# Patient Record
Sex: FEMALE | Race: ASIAN | NOT HISPANIC OR LATINO | URBAN - METROPOLITAN AREA
[De-identification: names, ages, dates, MRNs, and addresses within clinical notes are randomized per-mention and may not be internally consistent; named-entity substitution may affect disease eponyms.]

---

## 2021-01-18 ENCOUNTER — OUTPATIENT (OUTPATIENT)
Dept: OUTPATIENT SERVICES | Facility: HOSPITAL | Age: 21
LOS: 1 days | End: 2021-01-18
Payer: MEDICAID

## 2021-01-18 DIAGNOSIS — Z3A.00 WEEKS OF GESTATION OF PREGNANCY NOT SPECIFIED: ICD-10-CM

## 2021-01-18 DIAGNOSIS — O26.899 OTHER SPECIFIED PREGNANCY RELATED CONDITIONS, UNSPECIFIED TRIMESTER: ICD-10-CM

## 2021-01-18 LAB
APPEARANCE UR: CLEAR — SIGNIFICANT CHANGE UP
BACTERIA # UR AUTO: ABNORMAL /HPF
BILIRUB UR-MCNC: NEGATIVE — SIGNIFICANT CHANGE UP
COLOR SPEC: YELLOW — SIGNIFICANT CHANGE UP
DIFF PNL FLD: NEGATIVE — SIGNIFICANT CHANGE UP
EPI CELLS # UR: SIGNIFICANT CHANGE UP /HPF
GLUCOSE UR QL: 50 MG/DL
KETONES UR-MCNC: NEGATIVE — SIGNIFICANT CHANGE UP
LEUKOCYTE ESTERASE UR-ACNC: ABNORMAL
NITRITE UR-MCNC: NEGATIVE — SIGNIFICANT CHANGE UP
PH UR: 6.5 — SIGNIFICANT CHANGE UP (ref 5–8)
PROT UR-MCNC: 15
RBC CASTS # UR COMP ASSIST: SIGNIFICANT CHANGE UP /HPF (ref 0–2)
SP GR SPEC: 1.01 — SIGNIFICANT CHANGE UP (ref 1.01–1.02)
UROBILINOGEN FLD QL: NEGATIVE — SIGNIFICANT CHANGE UP
WBC UR QL: SIGNIFICANT CHANGE UP /HPF (ref 0–5)

## 2021-01-18 PROCEDURE — G0463: CPT

## 2021-01-18 PROCEDURE — 81001 URINALYSIS AUTO W/SCOPE: CPT

## 2021-01-18 PROCEDURE — 59025 FETAL NON-STRESS TEST: CPT

## 2021-01-18 PROCEDURE — 99281 EMR DPT VST MAYX REQ PHY/QHP: CPT

## 2021-02-02 ENCOUNTER — INPATIENT (INPATIENT)
Facility: HOSPITAL | Age: 21
LOS: 1 days | Discharge: HOME | End: 2021-02-04
Attending: SPECIALIST | Admitting: SPECIALIST
Payer: MEDICAID

## 2021-02-02 VITALS — TEMPERATURE: 99 F

## 2021-02-02 DIAGNOSIS — Z3A.00 WEEKS OF GESTATION OF PREGNANCY NOT SPECIFIED: ICD-10-CM

## 2021-02-02 DIAGNOSIS — O26.899 OTHER SPECIFIED PREGNANCY RELATED CONDITIONS, UNSPECIFIED TRIMESTER: ICD-10-CM

## 2021-02-02 LAB
ALBUMIN SERPL ELPH-MCNC: 3.9 G/DL — SIGNIFICANT CHANGE UP (ref 3.3–5)
ALP SERPL-CCNC: 463 U/L — HIGH (ref 40–120)
ALT FLD-CCNC: 14 U/L — SIGNIFICANT CHANGE UP (ref 4–33)
AMPHET UR-MCNC: NEGATIVE — SIGNIFICANT CHANGE UP
ANION GAP SERPL CALC-SCNC: 15 MMOL/L — HIGH (ref 7–14)
APTT BLD: 39.6 SEC — HIGH (ref 27–36.3)
AST SERPL-CCNC: 18 U/L — SIGNIFICANT CHANGE UP (ref 4–32)
BARBITURATES UR SCN-MCNC: NEGATIVE — SIGNIFICANT CHANGE UP
BASOPHILS # BLD AUTO: 0.04 K/UL — SIGNIFICANT CHANGE UP (ref 0–0.2)
BASOPHILS NFR BLD AUTO: 0.3 % — SIGNIFICANT CHANGE UP (ref 0–2)
BENZODIAZ UR-MCNC: NEGATIVE — SIGNIFICANT CHANGE UP
BILIRUB SERPL-MCNC: 0.3 MG/DL — SIGNIFICANT CHANGE UP (ref 0.2–1.2)
BLD GP AB SCN SERPL QL: NEGATIVE — SIGNIFICANT CHANGE UP
BUN SERPL-MCNC: 12 MG/DL — SIGNIFICANT CHANGE UP (ref 7–23)
CALCIUM SERPL-MCNC: 9.6 MG/DL — SIGNIFICANT CHANGE UP (ref 8.4–10.5)
CHLORIDE SERPL-SCNC: 99 MMOL/L — SIGNIFICANT CHANGE UP (ref 98–107)
CO2 SERPL-SCNC: 22 MMOL/L — SIGNIFICANT CHANGE UP (ref 22–31)
COCAINE METAB.OTHER UR-MCNC: NEGATIVE — SIGNIFICANT CHANGE UP
CREAT ?TM UR-MCNC: 107 MG/DL — SIGNIFICANT CHANGE UP
CREAT SERPL-MCNC: 0.66 MG/DL — SIGNIFICANT CHANGE UP (ref 0.5–1.3)
CREATININE URINE RESULT, DAU: 106 MG/DL — SIGNIFICANT CHANGE UP
EOSINOPHIL # BLD AUTO: 0.16 K/UL — SIGNIFICANT CHANGE UP (ref 0–0.5)
EOSINOPHIL NFR BLD AUTO: 1.3 % — SIGNIFICANT CHANGE UP (ref 0–6)
FIBRINOGEN PPP-MCNC: 763 MG/DL — HIGH (ref 290–520)
GLUCOSE SERPL-MCNC: 75 MG/DL — SIGNIFICANT CHANGE UP (ref 70–99)
HCT VFR BLD CALC: 39.5 % — SIGNIFICANT CHANGE UP (ref 34.5–45)
HGB BLD-MCNC: 12.4 G/DL — SIGNIFICANT CHANGE UP (ref 11.5–15.5)
HIV 1+2 AB+HIV1 P24 AG SERPL QL IA: SIGNIFICANT CHANGE UP
IANC: 8.38 K/UL — SIGNIFICANT CHANGE UP (ref 1.5–8.5)
IMM GRANULOCYTES NFR BLD AUTO: 0.7 % — SIGNIFICANT CHANGE UP (ref 0–1.5)
INR BLD: 0.87 RATIO — SIGNIFICANT CHANGE UP (ref 0.88–1.16)
LDH SERPL L TO P-CCNC: 208 U/L — SIGNIFICANT CHANGE UP (ref 135–225)
LYMPHOCYTES # BLD AUTO: 2.83 K/UL — SIGNIFICANT CHANGE UP (ref 1–3.3)
LYMPHOCYTES # BLD AUTO: 23 % — SIGNIFICANT CHANGE UP (ref 13–44)
MCHC RBC-ENTMCNC: 28.7 PG — SIGNIFICANT CHANGE UP (ref 27–34)
MCHC RBC-ENTMCNC: 31.4 GM/DL — LOW (ref 32–36)
MCV RBC AUTO: 91.4 FL — SIGNIFICANT CHANGE UP (ref 80–100)
METHADONE UR-MCNC: NEGATIVE — SIGNIFICANT CHANGE UP
MONOCYTES # BLD AUTO: 0.83 K/UL — SIGNIFICANT CHANGE UP (ref 0–0.9)
MONOCYTES NFR BLD AUTO: 6.7 % — SIGNIFICANT CHANGE UP (ref 2–14)
NEUTROPHILS # BLD AUTO: 8.38 K/UL — HIGH (ref 1.8–7.4)
NEUTROPHILS NFR BLD AUTO: 68 % — SIGNIFICANT CHANGE UP (ref 43–77)
NRBC # BLD: 0 /100 WBCS — SIGNIFICANT CHANGE UP
NRBC # FLD: 0 K/UL — SIGNIFICANT CHANGE UP
OPIATES UR-MCNC: NEGATIVE — SIGNIFICANT CHANGE UP
OXYCODONE UR-MCNC: NEGATIVE — SIGNIFICANT CHANGE UP
PCP SPEC-MCNC: SIGNIFICANT CHANGE UP
PCP UR-MCNC: NEGATIVE — SIGNIFICANT CHANGE UP
PLATELET # BLD AUTO: 276 K/UL — SIGNIFICANT CHANGE UP (ref 150–400)
POTASSIUM SERPL-MCNC: 4.1 MMOL/L — SIGNIFICANT CHANGE UP (ref 3.5–5.3)
POTASSIUM SERPL-SCNC: 4.1 MMOL/L — SIGNIFICANT CHANGE UP (ref 3.5–5.3)
PROT ?TM UR-MCNC: 22 MG/DL — SIGNIFICANT CHANGE UP
PROT ?TM UR-MCNC: 22 MG/DL — SIGNIFICANT CHANGE UP
PROT SERPL-MCNC: 8 G/DL — SIGNIFICANT CHANGE UP (ref 6–8.3)
PROT/CREAT UR-RTO: 0.2 RATIO — SIGNIFICANT CHANGE UP (ref 0–0.2)
PROTHROM AB SERPL-ACNC: 10.1 SEC — LOW (ref 10.6–13.6)
RBC # BLD: 4.32 M/UL — SIGNIFICANT CHANGE UP (ref 3.8–5.2)
RBC # FLD: 13.2 % — SIGNIFICANT CHANGE UP (ref 10.3–14.5)
RH IG SCN BLD-IMP: POSITIVE — SIGNIFICANT CHANGE UP
RH IG SCN BLD-IMP: POSITIVE — SIGNIFICANT CHANGE UP
SODIUM SERPL-SCNC: 136 MMOL/L — SIGNIFICANT CHANGE UP (ref 135–145)
THC UR QL: NEGATIVE — SIGNIFICANT CHANGE UP
URATE SERPL-MCNC: 4.3 MG/DL — SIGNIFICANT CHANGE UP (ref 2.5–7)
WBC # BLD: 12.33 K/UL — HIGH (ref 3.8–10.5)
WBC # FLD AUTO: 12.33 K/UL — HIGH (ref 3.8–10.5)

## 2021-02-02 RX ORDER — OXYTOCIN 10 UNIT/ML
333.33 VIAL (ML) INJECTION
Qty: 20 | Refills: 0 | Status: DISCONTINUED | OUTPATIENT
Start: 2021-02-02 | End: 2021-02-03

## 2021-02-02 RX ORDER — SODIUM CHLORIDE 9 MG/ML
1000 INJECTION, SOLUTION INTRAVENOUS
Refills: 0 | Status: DISCONTINUED | OUTPATIENT
Start: 2021-02-02 | End: 2021-02-03

## 2021-02-02 NOTE — OB PROVIDER TRIAGE NOTE - NS_FHRDECEL_OBGYN_ALL_OB
Spoke with pharmacy and they still have refills on file but will change to 3 month supply.  Radha Marshall RN, BSN     No Decelerations

## 2021-02-02 NOTE — OB PROVIDER H&P - NSHPPHYSICALEXAM_GEN_ALL_CORE
19 y/o  @ 40 wks gestation presents with c/o episode of vaginal spotting this AM and was seen in clinic @ [facility] clinic today in [place] and was told to go to L&D for monitoring pt reports having irregular uterine contractions denies any LOF reports +FM denies any n/v/d denies any fever or chills pt was receiving PNC 1st 6 months in [facility] and has been back and forth since 2020 between  and [facility]

## 2021-02-02 NOTE — OB PROVIDER H&P - ASSESSMENT
plan of care d/w dr cast   no evidence of VB  no evidence of active labor   will continue to monitor      addendum @ 1815:  plan of care d/w dr Javed/ Dr cast   admit to l&D  IOL @ 40 wks gest for po cytotec / cat 2 FHT   see admission orders

## 2021-02-02 NOTE — OB PROVIDER TRIAGE NOTE - NSHPPHYSICALEXAM_GEN_ALL_CORE
abdomen: soft, nt on palp  SSE: no evidence of VB   SVE: 1/60/-3  T(C): 37 (02-02-21 @ 16:49), Max: 37.0 (02-02-21 @ 16:41)  HR: 98 (02-02-21 @ 17:45) (98 - 111)  BP: 118/63 (02-02-21 @ 17:45) (118/63 - 135/76)  RR: 16 (02-02-21 @ 16:49) (16 - 16)  SpO2: --  TAS abdomen: soft, nt on palp  SSE: no evidence of VB   SVE: 1/60/-3  T(C): 37 (02-02-21 @ 16:49), Max: 37.0 (02-02-21 @ 16:41)  HR: 98 (02-02-21 @ 17:45) (98 - 111)  BP: 118/63 (02-02-21 @ 17:45) (118/63 - 135/76)  RR: 16 (02-02-21 @ 16:49) (16 - 16)  SpO2: --  TAS : BPP: 8/8 vtx posterior placenta FLOR: 6.29   cat 1 FHT  toco: every 5 minutes , pt comfortable denies any pain at this time    ht: 5'0  wt: 170

## 2021-02-02 NOTE — OB RN TRIAGE NOTE - CHIEF COMPLAINT QUOTE
my doctor at [facility] told me to come get monitored bc my due date is today and spotting this morning, and I was itchy this am

## 2021-02-02 NOTE — OB PROVIDER TRIAGE NOTE - HISTORY OF PRESENT ILLNESS
21 y/o  @ 40 wks gestation presents with c/o episode of vaginal spotting this AM and was seen in clinic @ [facility] clinic today in [place] and was told to go to L&D for monitoring pt reports having irregular uterine contractions denies any LOF reports +FM denies any n/v/d denies any fever or chills pt was receiving PNC 1st 6 months in [facility] and has been back and forth since 2020 between  and [facility]

## 2021-02-02 NOTE — OB PROVIDER H&P - HISTORY OF PRESENT ILLNESS
21 y/o  @ 40 wks gestation presents with c/o episode of vaginal spotting this AM and was seen in clinic @ [facility] clinic today in [place] and was told to go to L&D for monitoring pt reports having irregular uterine contractions denies any LOF reports +FM denies any n/v/d denies any fever or chills pt was receiving PNC 1st 6 months in [facility] and has been back and forth since 2020 between  and [facility]

## 2021-02-02 NOTE — OB PROVIDER TRIAGE NOTE - NSOBPROVIDERNOTE_OBGYN_ALL_OB_FT
19 y/o  @ 40 wks gestation presents with c/o episode of vaginal spotting this AM and was seen in clinic @ [facility] clinic today in [place] and was told to go to L&D for monitoring pt reports having irregular uterine contractions denies any LOF reports +FM denies any n/v/d denies any fever or chills pt was receiving PNC 1st 6 months in [facility] and has been back and forth since 2020 between  and [facility] 19 y/o  @ 40 wks gestation presents with c/o episode of vaginal spotting this AM and was seen in clinic @ [facility] clinic today in [place] and was told to go to L&D for monitoring pt reports having irregular uterine contractions denies any LOF reports +FM denies any n/v/d denies any fever or chills pt was receiving PNC 1st 6 months in [facility] and has been back and forth since 2020 between  and [facility]      plan of care d/w dr cast   no evidnence of VB  no evidence of active labor   will continue to monitor 21 y/o  @ 40 wks gestation presents with c/o episode of vaginal spotting this AM and was seen in clinic @ [facility] clinic today in [place] and was told to go to L&D for monitoring pt reports having irregular uterine contractions denies any LOF reports +FM denies any n/v/d denies any fever or chills pt was receiving PNC 1st 6 months in [facility] and has been back and forth since 2020 between  and [facility]      plan of care d/w dr cast   no evidence of VB  no evidence of active labor   will continue to monitor      addendum @ 181:  plan of care d/w dr Javed/ Dr cast   admit to l&D  IOL @ 40 wks gest for po cytotec / cat 2 FHT   see admission orders

## 2021-02-02 NOTE — CHART NOTE - NSCHARTNOTEFT_GEN_A_CORE
*NOTE DELAYED 2/2 CLINICAL DUTIES*    PGY1 Labor & Delivery Progress Note     Pt seen & examined at bedside. Balloon was placed, bleeding was noted, decision was made to remove cervical ballloon.    SVE: /-3  EFM: 130/mod. variability/+accles/-decels  [place]: irregular    T(C): 37.2 (21 @ 21:32), Max: 37.2 (21 @ 19:00)  HR: 112 (21 @ 21:36) (96 - 114)  BP: 120/59 (21 @ 21:27) (117/58 - 135/76)  RR: 18 (21 @ 19:06) (16 - 18)  SpO2: 99% (21 @ 21:36) (98% - 100%)      Plan: 19 y/o  @40w in stable condition  - Con't IOL w/ PO cytotec  - Continuous EFM, [place]  - Con't IVF    D/W attending physician Dr. Tegan Winston MD  PGY-1

## 2021-02-03 LAB
HBV SURFACE AG SER-ACNC: SIGNIFICANT CHANGE UP
RUBV IGG SER-ACNC: 5.6 INDEX — SIGNIFICANT CHANGE UP
RUBV IGG SER-IMP: POSITIVE — SIGNIFICANT CHANGE UP
SARS-COV-2 IGG SERPL QL IA: NEGATIVE — SIGNIFICANT CHANGE UP
SARS-COV-2 IGM SERPL IA-ACNC: 0.08 INDEX — SIGNIFICANT CHANGE UP
SARS-COV-2 RNA SPEC QL NAA+PROBE: SIGNIFICANT CHANGE UP
T PALLIDUM AB TITR SER: NEGATIVE — SIGNIFICANT CHANGE UP

## 2021-02-03 PROCEDURE — 59409 OBSTETRICAL CARE: CPT | Mod: U9,GC

## 2021-02-03 RX ORDER — LANOLIN
1 OINTMENT (GRAM) TOPICAL EVERY 6 HOURS
Refills: 0 | Status: DISCONTINUED | OUTPATIENT
Start: 2021-02-03 | End: 2021-02-04

## 2021-02-03 RX ORDER — DIPHENHYDRAMINE HCL 50 MG
25 CAPSULE ORAL EVERY 6 HOURS
Refills: 0 | Status: DISCONTINUED | OUTPATIENT
Start: 2021-02-03 | End: 2021-02-04

## 2021-02-03 RX ORDER — BENZOCAINE 10 %
1 GEL (GRAM) MUCOUS MEMBRANE EVERY 6 HOURS
Refills: 0 | Status: DISCONTINUED | OUTPATIENT
Start: 2021-02-03 | End: 2021-02-04

## 2021-02-03 RX ORDER — TETANUS TOXOID, REDUCED DIPHTHERIA TOXOID AND ACELLULAR PERTUSSIS VACCINE, ADSORBED 5; 2.5; 8; 8; 2.5 [IU]/.5ML; [IU]/.5ML; UG/.5ML; UG/.5ML; UG/.5ML
0.5 SUSPENSION INTRAMUSCULAR ONCE
Refills: 0 | Status: DISCONTINUED | OUTPATIENT
Start: 2021-02-03 | End: 2021-02-04

## 2021-02-03 RX ORDER — SODIUM CHLORIDE 9 MG/ML
3 INJECTION INTRAMUSCULAR; INTRAVENOUS; SUBCUTANEOUS EVERY 8 HOURS
Refills: 0 | Status: DISCONTINUED | OUTPATIENT
Start: 2021-02-03 | End: 2021-02-04

## 2021-02-03 RX ORDER — ACETAMINOPHEN 500 MG
975 TABLET ORAL
Refills: 0 | Status: DISCONTINUED | OUTPATIENT
Start: 2021-02-03 | End: 2021-02-04

## 2021-02-03 RX ORDER — MAGNESIUM HYDROXIDE 400 MG/1
30 TABLET, CHEWABLE ORAL
Refills: 0 | Status: DISCONTINUED | OUTPATIENT
Start: 2021-02-03 | End: 2021-02-04

## 2021-02-03 RX ORDER — OXYCODONE HYDROCHLORIDE 5 MG/1
5 TABLET ORAL ONCE
Refills: 0 | Status: DISCONTINUED | OUTPATIENT
Start: 2021-02-03 | End: 2021-02-04

## 2021-02-03 RX ORDER — PRAMOXINE HYDROCHLORIDE 150 MG/15G
1 AEROSOL, FOAM RECTAL EVERY 4 HOURS
Refills: 0 | Status: DISCONTINUED | OUTPATIENT
Start: 2021-02-03 | End: 2021-02-04

## 2021-02-03 RX ORDER — HYDROCORTISONE 1 %
1 OINTMENT (GRAM) TOPICAL EVERY 6 HOURS
Refills: 0 | Status: DISCONTINUED | OUTPATIENT
Start: 2021-02-03 | End: 2021-02-04

## 2021-02-03 RX ORDER — SIMETHICONE 80 MG/1
80 TABLET, CHEWABLE ORAL EVERY 4 HOURS
Refills: 0 | Status: DISCONTINUED | OUTPATIENT
Start: 2021-02-03 | End: 2021-02-04

## 2021-02-03 RX ORDER — OXYCODONE HYDROCHLORIDE 5 MG/1
5 TABLET ORAL
Refills: 0 | Status: DISCONTINUED | OUTPATIENT
Start: 2021-02-03 | End: 2021-02-04

## 2021-02-03 RX ORDER — OXYTOCIN 10 UNIT/ML
333.33 VIAL (ML) INJECTION
Qty: 20 | Refills: 0 | Status: DISCONTINUED | OUTPATIENT
Start: 2021-02-03 | End: 2021-02-04

## 2021-02-03 RX ORDER — DIBUCAINE 1 %
1 OINTMENT (GRAM) RECTAL EVERY 6 HOURS
Refills: 0 | Status: DISCONTINUED | OUTPATIENT
Start: 2021-02-03 | End: 2021-02-04

## 2021-02-03 RX ORDER — AER TRAVELER 0.5 G/1
1 SOLUTION RECTAL; TOPICAL EVERY 4 HOURS
Refills: 0 | Status: DISCONTINUED | OUTPATIENT
Start: 2021-02-03 | End: 2021-02-04

## 2021-02-03 RX ORDER — IBUPROFEN 200 MG
600 TABLET ORAL EVERY 6 HOURS
Refills: 0 | Status: COMPLETED | OUTPATIENT
Start: 2021-02-03 | End: 2022-01-02

## 2021-02-03 RX ORDER — KETOROLAC TROMETHAMINE 30 MG/ML
30 SYRINGE (ML) INJECTION ONCE
Refills: 0 | Status: DISCONTINUED | OUTPATIENT
Start: 2021-02-03 | End: 2021-02-03

## 2021-02-03 RX ORDER — SODIUM CHLORIDE 9 MG/ML
1000 INJECTION, SOLUTION INTRAVENOUS
Refills: 0 | Status: DISCONTINUED | OUTPATIENT
Start: 2021-02-03 | End: 2021-02-03

## 2021-02-03 RX ADMIN — SODIUM CHLORIDE 3 MILLILITER(S): 9 INJECTION INTRAMUSCULAR; INTRAVENOUS; SUBCUTANEOUS at 23:51

## 2021-02-03 RX ADMIN — Medication 975 MILLIGRAM(S): at 20:15

## 2021-02-03 RX ADMIN — Medication 30 MILLIGRAM(S): at 18:45

## 2021-02-03 RX ADMIN — SODIUM CHLORIDE 125 MILLILITER(S): 9 INJECTION, SOLUTION INTRAVENOUS at 08:03

## 2021-02-03 RX ADMIN — Medication 1000 MILLIUNIT(S)/MIN: at 18:45

## 2021-02-03 NOTE — OB NEONATOLOGY/PEDIATRICIAN DELIVERY SUMMARY - NSPEDSNEONOTESA_OBGYN_ALL_OB_FT
Baby is a 40.1 wk GA male born to a 19 y/o  mother via . PEDS called to delivery for cat. II tracing. Maternal history uncomplicated. Prenatal history aspirin during pregnancy. Maternal blood type O+. PNL negative, non-reactive, and immune. GBS negative on . SROM at 12:34 on , indeterminate fluids. Baby born vigorous and crying spontaneously. Warmed, dried, stimulated. Apgars 8/9. EOS 0.06. Mom plans to breastfeed and bottlefeed and consents hepB. Circ requested.

## 2021-02-03 NOTE — CHART NOTE - NSCHARTNOTEFT_GEN_A_CORE
Prenatal records received from [facility] [place]  chart reviewed  gbs negative 1/6/21  quant gold positive 11/17/20  chest x ray negative 12/2/20 (visible in [place] but unable to print results)    DONAVON Bruner NP

## 2021-02-03 NOTE — CHART NOTE - NSCHARTNOTEFT_GEN_A_CORE
R2 Chart note    Called [facility] to follow up regarding release of prenatal records.  Unit receptionist still had release at the desk which was faxed over at 2am.  She said she would now give it to the resident and they would fax the records.  However due to change of shift and morning rounds there will be a delay of at least one hour.  Direct number to their L&D provided ([phone]).    Will continue to follow up if records are not received.    d/w Dr. Elsie Huston, PGY2

## 2021-02-03 NOTE — OB RN DELIVERY SUMMARY - NS_SEPSISRSKCALC_OBGYN_ALL_OB_FT
EOS calculated successfully. EOS Risk Factor: 0.5/1000 live births ([facility] national incidence); GA=40w1d; Temp=99.32; ROM=3.983; GBS='Unknown'; Antibiotics='No antibiotics or any antibiotics < 2 hrs prior to birth'

## 2021-02-03 NOTE — OB PROVIDER DELIVERY SUMMARY - NSPROVIDERDELIVERYNOTE_OBGYN_ALL_OB_FT
Pt was noted to be fully dilated with the urge to push. Spontaneous vaginal delivery of liveborn infant from JACQUELYN position. Head, shoulders, and body delivered easily. Infant handed to mom, delayed cord clamping. Cord clamped and cut, infant handed to peds. Placenta delivered spontaneously and intact. 2nd degree laceration and right periurethral noted and repaired with 3.0 and 2.0 chromic. Excellent hemostasis noted. Count correct x2.

## 2021-02-03 NOTE — OB PROVIDER LABOR PROGRESS NOTE - ASSESSMENT
19yo  IOL  - CB placed at 7:45am   - continue PO cytotec  - routine labor care  - continuous monitoring  - pt comfortable with epidural    per plan  Candace Johnson PGY1
19yo  IOL  - s/p PO and CB  - Expectant management  - start pitocin if no progress  - continuous monitoring  - routine labor care    d/w Dr. Mariluz Johnson PGy1

## 2021-02-03 NOTE — CHART NOTE - NSCHARTNOTEFT_GEN_A_CORE
R2 Ob Chart Note    Pt received prenatal care at [facility].   Contacted L+D at [facility] for release of records.   HIPAA release signed by patient and faxed to [phone].   Provided w/ [facility] L+D Fax number [phone].     Richard PGY2

## 2021-02-03 NOTE — OB PROVIDER LABOR PROGRESS NOTE - NS_SUBJECTIVE/OBJECTIVE_OBGYN_ALL_OB_FT
Pt examined at bedside for cervical change
Pt examined at bedside for placement of cervical balloon. CB placed without incidence and pt tolerated procedure well

## 2021-02-04 VITALS
TEMPERATURE: 98 F | HEART RATE: 76 BPM | DIASTOLIC BLOOD PRESSURE: 59 MMHG | SYSTOLIC BLOOD PRESSURE: 112 MMHG | RESPIRATION RATE: 18 BRPM | OXYGEN SATURATION: 100 %

## 2021-02-04 RX ORDER — IBUPROFEN 200 MG
600 TABLET ORAL EVERY 6 HOURS
Refills: 0 | Status: DISCONTINUED | OUTPATIENT
Start: 2021-02-04 | End: 2021-02-04

## 2021-02-04 RX ORDER — ASPIRIN/CALCIUM CARB/MAGNESIUM 324 MG
0 TABLET ORAL
Qty: 0 | Refills: 0 | DISCHARGE

## 2021-02-04 RX ORDER — IBUPROFEN 200 MG
1 TABLET ORAL
Qty: 0 | Refills: 0 | DISCHARGE
Start: 2021-02-04

## 2021-02-04 RX ORDER — ACETAMINOPHEN 500 MG
3 TABLET ORAL
Qty: 0 | Refills: 0 | DISCHARGE
Start: 2021-02-04

## 2021-02-04 RX ADMIN — Medication 975 MILLIGRAM(S): at 03:43

## 2021-02-04 RX ADMIN — Medication 600 MILLIGRAM(S): at 09:00

## 2021-02-04 RX ADMIN — Medication 600 MILLIGRAM(S): at 18:09

## 2021-02-04 RX ADMIN — Medication 1 TABLET(S): at 09:25

## 2021-02-04 NOTE — DISCHARGE NOTE OB - CARE PROVIDER_API CALL
[facility],   Phone: [phone]  Fax: (   )    -  Follow Up Time:     [facility],   Ambulatory Care Unit, Oncology Building  [address] [place] [address]  Phone: [phone]  Fax: (   )    -  Follow Up Time:

## 2021-02-04 NOTE — LACTATION INITIAL EVALUATION - INTERVENTION OUTCOME
nbn demonstrated  deep latch and  performed  with sucking and swallowing  noted  short bursts. encouraged  to ask for assistance as needed. . follow  up with peds in 24 hours if  discharged today./verbalizes understanding/needs not met

## 2021-02-04 NOTE — DISCHARGE NOTE OB - HOSPITAL COURSE
Patient was admitted to L+D for , Pt had an uncomplicated  followed by an uncomplicated postpartum course.  EBL: 300  Hct: 39.5  On Postpartum day 1, patient was discharged home in stable condition, voiding spontaneously, pain well controlled, ambulating, tolerating PO and with normal vital signs. Patient's postpartum birth control plan is a postpartum nexplanon. Pt plans to follow up in the [facility] in 6 weeks. Telephone number and clinic information provided prior to discharge.

## 2021-02-04 NOTE — DISCHARGE NOTE OB - COMMUNITY RESOURCE NAME:
Patient instructed to make a postpartum follow up appointment at the Ambulatory Care Unit at [facility]  4-6 weeks after her delivery date. Patient also instructed to make follow up appointment for the baby at [facility], Division of General Pediatrics  for 1-2 days after discharge.

## 2021-02-04 NOTE — DISCHARGE NOTE OB - ADDITIONAL INSTRUCTIONS
Make your follow-up appointment with your doctor as ordered.  Make an appt in 6 weeks for a routine postpartum visit.     No heavy lifting, driving, or strenuous activity for 6 weeks. Nothing per vagina such as tampons, intercourse, douches, or tub baths for 6 weeks or until you see your doctor. Call your doctor with any signs and symptoms of infection such as fever, chills, nausea, or vomiting. Call your doctor if you're unable to tolerate food, increase in vaginal bleeding, or have difficulty urinating. Call your doctor if you have pain that is not relieved by your prescribed medications. Notify your doctor with any other concerns.     Call [phone] if you have any of these concerns in the next 6 weeks.

## 2021-02-04 NOTE — LACTATION INITIAL EVALUATION - LACTATION INTERVENTIONS
reviewed with  mother breastfeeding section  of  postpartum  book./initiate skin to skin/initiate hand expression routine/initiate/review finger suck/initiate/review breast massage/compression

## 2021-02-04 NOTE — DISCHARGE NOTE OB - CARE PLAN
Principal Discharge DX:	 (normal spontaneous vaginal delivery)  Goal:	full recovery  Assessment and plan of treatment:	Make your follow-up appointment with your doctor as ordered.  Make an appt in 6 weeks for a routine postpartum visit.     No heavy lifting, driving, or strenuous activity for 6 weeks. Nothing per vagina such as tampons, intercourse, douches, or tub baths for 6 weeks or until you see your doctor. Call your doctor with any signs and symptoms of infection such as fever, chills, nausea, or vomiting. Call your doctor if you're unable to tolerate food, increase in vaginal bleeding, or have difficulty urinating. Call your doctor if you have pain that is not relieved by your prescribed medications. Notify your doctor with any other concerns.     Call [phone] if you have any of these concerns in the next 6 weeks.

## 2021-02-04 NOTE — DISCHARGE NOTE OB - PATIENT PORTAL LINK FT
You can access the FollowMyHealth Patient Portal offered by [facility] by registering at the following website: [email or web address]. By joining [facility]’s FollowMyHealth portal, you will also be able to view your health information using other applications (apps) compatible with our system.

## 2021-02-04 NOTE — PROGRESS NOTE ADULT - PROBLEM SELECTOR PLAN 1
- Pain well controlled, continue current pain regimen  - Increase ambulation, SCDs when not ambulating  - Continue regular diet  - Desires circumcision  - Contraception:     Ofelia White MD PGY1 - Pain well controlled, continue current pain regimen  - Increase ambulation, SCDs when not ambulating  - Continue regular diet  - Desires circumcision  - Contraception: desires Nexplanon at 6wk postpartum visit    Ofelia White MD PGY1 - Pain well controlled, continue current pain regimen  - Increase ambulation, SCDs when not ambulating  - Continue regular diet  - Declines circumcision  - Contraception: desires Nexplanon at 6wk postpartum visit    Ofelia White MD PGY1

## 2021-02-04 NOTE — PROGRESS NOTE ADULT - ATTENDING COMMENTS
Associate Chief of L & D (Late entry)     I have not met this patient before today.  She was admitted by Dr Javed. She received her care at a [facility] affiliated with [facility].  She is also new to the  from [facility].  She was delivered by Dr. Abreu      OB Progress Note:  PPD#1    S: 19yo  PPD#1 s/p . Patient denies any complaints at this time    O:  Vitals:  Vital Signs Last 24 Hrs  T(C): 36.9 (2021 09:55), Max: 37.3 (2021 21:34)  T(F): 98.5 (2021 09:55), Max: 99.1 (2021 21:34)  HR: 92 (2021 09:55) (82 - 112)  BP: 133/84 (2021 09:55) (102/51 - 134/65)  RR: 18 (2021 09:55) (16 - 20)  SpO2: 99% (2021 09:55) (88% - 100%)    MEDICATIONS  (STANDING):  acetaminophen   Tablet .. 975 milliGRAM(s) Oral <User Schedule>  diphtheria/tetanus/pertussis (acellular) Vaccine (ADAcel) 0.5 milliLiter(s) IntraMuscular once  ibuprofen  Tablet. 600 milliGRAM(s) Oral every 6 hours  prenatal multivitamin 1 Tablet(s) Oral daily  sodium chloride 0.9% lock flush 3 milliLiter(s) IV Push every 8 hours      Labs:  Blood type: O Positive  Rubella IgG: Positive ( @ 09:20)  RPR: Negative                          12.4   12.33<H> >-----------< 276    (  @ 19:57 )             39.5    21 @ 19:57      136  |  99  |  12  ----------------------------<  75  4.1   |  22  |  0.66        Ca    9.6      2021 19:57    TPro  8.0  /  Alb  3.9  /  TBili  0.3  /  DBili  x   /  AST  18  /  ALT  14  /  AlkPhos  463<H>  21 @ 19:57      Physical Exam:  Abdomen: soft, fundus firm, NT, ND  Vaginal: Lochia wnl sutures in situ  Extremities: No erythema/ trace edema    A/P: 19yo PPD#1 s/p  and repair of 2nd degree laceration and periurethral  due to CAT II  - Patient is stable for discharge and will follow up in the PP clinic    Lisette Castro M.D., M.B.A., M.S.

## 2021-02-04 NOTE — DISCHARGE NOTE OB - MEDICATION SUMMARY - MEDICATIONS TO TAKE
I will START or STAY ON the medications listed below when I get home from the hospital:    acetaminophen 325 mg oral tablet  -- 3 tab(s) by mouth   -- Indication: For Pain    ibuprofen 600 mg oral tablet  -- 1 tab(s) by mouth every 6 hours  -- Indication: For pain    Prenatal Multivitamins with Folic Acid 1 mg oral tablet  -- 1 tab(s) by mouth once a day  -- Indication: For Vitamins

## 2021-02-04 NOTE — DISCHARGE NOTE OB - PLAN OF CARE
Make your follow-up appointment with your doctor as ordered.  Make an appt in 6 weeks for a routine postpartum visit.     No heavy lifting, driving, or strenuous activity for 6 weeks. Nothing per vagina such as tampons, intercourse, douches, or tub baths for 6 weeks or until you see your doctor. Call your doctor with any signs and symptoms of infection such as fever, chills, nausea, or vomiting. Call your doctor if you're unable to tolerate food, increase in vaginal bleeding, or have difficulty urinating. Call your doctor if you have pain that is not relieved by your prescribed medications. Notify your doctor with any other concerns.     Call [phone] if you have any of these concerns in the next 6 weeks. full recovery

## 2021-02-04 NOTE — DISCHARGE NOTE OB - PROVIDER TOKENS
FREE:[LAST:[[facility]],PHONE:[phone]],FAX:[(   )    -]],FREE:[LAST:[[facility]],PHONE:[phone]],FAX:[(   )    -],ADDRESS:[Ambulatory Care Unit, [place] [address]]]

## 2022-06-01 NOTE — OB PROVIDER H&P - NSHPROSALLOTHERNEGRD_GEN_ALL_CORE
Patient requests all Lab, Cardiology, and Radiology Results on their Discharge Instructions All other review of systems negative, except as noted in HPI

## 2023-06-05 NOTE — OB PROVIDER H&P - NS_SPECEXAM_OBGYN_ALL_OB
Well controlled on current therapy continue with current medications and will reassess next visit Yes

## 2024-02-02 NOTE — OB RN PATIENT PROFILE - TEACHING/LEARNING FACTORS INFLUENCE READINESS TO LEARN

## 2024-02-12 NOTE — OB RN PATIENT PROFILE - MATERNAL MARITAL STATUS, OB PROFILE
No aspirin or ibuprofen products for 1 week prior to surgery  No vitamins or supplements for 1 week prior to surgery       Complete lab work today. Office will call you with results.    single